# Patient Record
Sex: MALE | URBAN - METROPOLITAN AREA
[De-identification: names, ages, dates, MRNs, and addresses within clinical notes are randomized per-mention and may not be internally consistent; named-entity substitution may affect disease eponyms.]

---

## 2018-01-01 ENCOUNTER — EMERGENCY (EMERGENCY)
Facility: HOSPITAL | Age: 58
LOS: 1 days | End: 2018-01-01
Attending: EMERGENCY MEDICINE | Admitting: EMERGENCY MEDICINE
Payer: SELF-PAY

## 2018-01-01 PROCEDURE — 31500 INSERT EMERGENCY AIRWAY: CPT

## 2018-01-01 PROCEDURE — 99291 CRITICAL CARE FIRST HOUR: CPT | Mod: 25

## 2018-04-07 NOTE — ED PROVIDER NOTE - MEDICAL DECISION MAKING DETAILS
57M with cardiac arrest.  PEA prior to arrival.  In ED chest compressions continued.  PEA.  ETT confirmed, but unable to blow up cuff, replaced over bougie using glidescope. Given epi, narcan, calcium, bicarb, D50.  Two episodes of question fine v-fib, shock and double sequential shock without effect.  Given tPA and 20 min of CPR.  No pulses, no cardiac activity on bedside u/s. Time of death 7:46pm.

## 2018-04-07 NOTE — ED PROVIDER NOTE - CRITICAL CARE PROVIDED
interpretation of diagnostic studies/additional history taking/direct patient care (not related to procedure)

## 2018-04-07 NOTE — ED ADULT NURSE NOTE - OBJECTIVE STATEMENT
Patient was palying basketball brought in by EMS Patient was playing basketball than collapsed , time arrest called 18:37 per EMS; ACLS protocol applied per hospital policy, attending Dr. Portillo, resident , RN Stacey Carpio, DALTON García, and RN Candelaria Cannon at bed side. Patient was pronounced at 19:46 on 04/07/2018 by Dr. Portillo, family was notified; Patient was playing basketball than collapsed , time arrest called 18:37 per EMS; ACLS protocol applied per hospital policy, attending Dr. Portillo, resident , RN Stacey Carpio, DALTON García, and DALTON Cannon at bed side. Patient was pronounced at 19:46 on 04/07/2018 by Dr. Portillo, family was notified. Organ donation was notified by DALTON Freitas

## 2018-04-07 NOTE — ED ADULT NURSE REASSESSMENT NOTE - NS ED NURSE REASSESS COMMENT FT1
pt personal belongings given to pt sister Mary Lange [phone]. Itemized belonging list placed in pt yellow folder.  (fac) pt personal belongings given to pt sister Mary Lange [phone]. As per SW sister is next of kin @ bedside. Itemized belonging list placed in pt yellow folder.  (fac)

## 2018-04-07 NOTE — PROVIDER CONTACT NOTE (OTHER) - ASSESSMENT
Medical team referred this case as pt came as cardiac arrest .  Pt is 56y/o male. Pts writer is alone. pts family arrived to ED. Medical team met with the pts family member and explained the cause of death. Writer met with the Ex - Wife Val Lange [phone]  and biological sister Mary Lange  [phone] . As per ex wife” he was playing basketball and fell down".  Writer provided Pts family was provided with emotional and supportive counseling to cope with this loss. Writer provided with education about the  procedure to the family members. However medical team informed this is a ME accepted this case.  Pts family viewed the body in trauma room B. Writer made aware of this intervention to the medical team.

## 2018-04-07 NOTE — ED PROVIDER NOTE - OBJECTIVE STATEMENT
57M unknown medical history presents with cardiac arrest.  Per EMS there was a call for an unresponsive patients, reportedly playing basketball and sudden collapse.  Initially breathing but apnec on EMS arrival.  CPR initiated for PEA. Intuabated prior to arrival. Given Epi x4.  Approx 25 min CPR prior to ED arrival.

## 2018-04-07 NOTE — ED PROCEDURE NOTE - CPROC ED TRACHE INTUB DETAIL1
Patient was pre-oxygenated. An endotracheal tube (ETT) was placed through the vocal cords into the trachea.  ETT position was confirmed by auscultation of bilateral breath sounds to all lung fields. ETCO2 level was appropriate.

## 2018-04-07 NOTE — ED PROVIDER NOTE - PHYSICAL EXAMINATION
Gen: unresponsive  Head: 3cm laceration posterior scalp  Mouth: ETT in place  Heart: no pulses  Lungs: b/l BS  Abd: obese, soft  Ext: no signs of trauma  Skin: laceration on head